# Patient Record
Sex: MALE | Race: WHITE | URBAN - METROPOLITAN AREA
[De-identification: names, ages, dates, MRNs, and addresses within clinical notes are randomized per-mention and may not be internally consistent; named-entity substitution may affect disease eponyms.]

---

## 2019-07-08 ENCOUNTER — OFFICE VISIT (OUTPATIENT)
Dept: URBAN - METROPOLITAN AREA CLINIC 23 | Facility: CLINIC | Age: 79
End: 2019-07-08
Payer: COMMERCIAL

## 2019-07-08 PROCEDURE — 92134 CPTRZ OPH DX IMG PST SGM RTA: CPT | Performed by: OPHTHALMOLOGY

## 2019-07-08 PROCEDURE — 99213 OFFICE O/P EST LOW 20 MIN: CPT | Performed by: OPHTHALMOLOGY

## 2019-07-08 ASSESSMENT — INTRAOCULAR PRESSURE
OD: 14
OS: 14

## 2019-07-08 NOTE — IMPRESSION/PLAN
Impression: Puckering of macula, left eye: H35.372. OS. Condition: stable. Vision: vision not affected. s/p PPVX for removal of ERM OD 12/08/2004 with Dr. Burke Benavidez post 
Pneumatic Retinopexy OD 09/07/2004 in [place] Plan: Discussed diagnosis in detail with patient. Exam and OCT OS shows minimal ILM / ERM with good vision. No treatment is required at this time. Will continue to observe condition and or symptoms. Exam and OCT OD shows the macula is stable, no ERM seen. Recommend a yearly follow - up, sooner if there is a change or decrease in vision.

## 2020-07-10 ENCOUNTER — OFFICE VISIT (OUTPATIENT)
Dept: URBAN - METROPOLITAN AREA CLINIC 23 | Facility: CLINIC | Age: 80
End: 2020-07-10
Payer: COMMERCIAL

## 2020-07-10 DIAGNOSIS — H35.372 PUCKERING OF MACULA, LEFT EYE: Primary | ICD-10-CM

## 2020-07-10 PROCEDURE — 99213 OFFICE O/P EST LOW 20 MIN: CPT | Performed by: OPHTHALMOLOGY

## 2020-07-10 ASSESSMENT — INTRAOCULAR PRESSURE
OS: 15
OD: 15

## 2020-07-10 NOTE — IMPRESSION/PLAN
Impression: Puckering of macula, left eye: H35.372. OS. Condition: stable. Vision: vision not affected. s/p PPVX for removal of ERM OD 12/08/2004 with Dr. Amee Moore post 
Pneumatic Retinopexy OD 09/07/2004 in [place] Plan: Due to Coronavirus COVID-19 pandemic and National Emergency, deferred Slit Lamp examination. Findings are based on OCT and Optos. OCT shows stable no significant edema or ERM OS  and Optos confirms this. No treatment is require at this time. Recommend a retina follow - up in 1 year.

## 2021-07-15 ENCOUNTER — OFFICE VISIT (OUTPATIENT)
Dept: URBAN - METROPOLITAN AREA CLINIC 23 | Facility: CLINIC | Age: 81
End: 2021-07-15
Payer: COMMERCIAL

## 2021-07-15 PROCEDURE — 99213 OFFICE O/P EST LOW 20 MIN: CPT | Performed by: OPHTHALMOLOGY

## 2021-07-15 PROCEDURE — 92134 CPTRZ OPH DX IMG PST SGM RTA: CPT | Performed by: OPHTHALMOLOGY

## 2021-07-15 ASSESSMENT — INTRAOCULAR PRESSURE
OD: 14
OS: 14

## 2021-07-15 NOTE — IMPRESSION/PLAN
Impression: Puckering of macula, left eye: H35.372. OS. Condition: stable. Vision: vision not affected. s/p PPVX for removal of ERM OD 12/08/2004 with Dr. Amee Moore post 
Pneumatic Retinopexy OD 09/07/2004 in [address]: Advised patient of condition. Discussed diagnosis in detail with patient. Exam OS shows stable trace ILM/ERM temporal to fovea. OCT OS shows the macula is stable, no significant edema or ERM and Optos OS confirms these findings. No treatment is required at this time. Recommend a retina follow-up in 1 year, sooner if there is a sudden change in vision.

## 2022-07-20 ENCOUNTER — OFFICE VISIT (OUTPATIENT)
Dept: URBAN - METROPOLITAN AREA CLINIC 23 | Facility: CLINIC | Age: 82
End: 2022-07-20
Payer: MEDICARE

## 2022-07-20 DIAGNOSIS — H34.211 HOLLENHORST'S PLAQUE OF RIGHT EYE: ICD-10-CM

## 2022-07-20 DIAGNOSIS — H35.372 PUCKERING OF MACULA, LEFT EYE: Primary | ICD-10-CM

## 2022-07-20 PROCEDURE — 99213 OFFICE O/P EST LOW 20 MIN: CPT | Performed by: OPHTHALMOLOGY

## 2022-07-20 PROCEDURE — 92134 CPTRZ OPH DX IMG PST SGM RTA: CPT | Performed by: OPHTHALMOLOGY

## 2022-07-20 ASSESSMENT — INTRAOCULAR PRESSURE
OD: 14
OS: 14

## 2022-07-20 NOTE — IMPRESSION/PLAN
Impression: Puckering of macula, left eye: H35.372. OS. Condition: stable. Vision: vision not affected. s/p PPVX for removal of ERM OD 12/08/2004 with Dr. Burke Benavidez post Pneumatic Retinopexy OD 09/07/2004 in [address]: Advised patient of condition. Discussed diagnosis in detail with patient. Exam OS shows stable trace ILM/ERM temporal to fovea. OCT OS shows stable appearing and Optos OS shows stable. No treatment is required at this time. Recommend a retina follow-up in 1 year, sooner if there is a sudden change in vision.

## 2022-07-20 NOTE — IMPRESSION/PLAN
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 Plan: Discussed diagnosis in detail with patient. Exam shows questionable Hollenhorst Plaque. Discussed treatment options with patient. Recommend a follow-up with PCP for further evaluation/testing: Echocardiogram and Carotid doppler. OCT shows stable and Optos shows retina appears fully attached, ? Hollenhorst Plaque.

## 2023-07-13 ENCOUNTER — OFFICE VISIT (OUTPATIENT)
Dept: URBAN - METROPOLITAN AREA CLINIC 23 | Facility: CLINIC | Age: 83
End: 2023-07-13
Payer: MEDICARE

## 2023-07-13 DIAGNOSIS — H34.211 HOLLENHORST'S PLAQUE OF RIGHT EYE: ICD-10-CM

## 2023-07-13 DIAGNOSIS — H35.372 PUCKERING OF MACULA, LEFT EYE: Primary | ICD-10-CM

## 2023-07-13 PROCEDURE — 92134 CPTRZ OPH DX IMG PST SGM RTA: CPT | Performed by: OPHTHALMOLOGY

## 2023-07-13 PROCEDURE — 99213 OFFICE O/P EST LOW 20 MIN: CPT | Performed by: OPHTHALMOLOGY

## 2023-07-13 ASSESSMENT — INTRAOCULAR PRESSURE
OD: 13
OS: 12

## 2023-07-13 NOTE — IMPRESSION/PLAN
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 Plan: Discussed diagnosis in detail with patient. Patient saw his cardiologist for   Echocardiogram and Carotid doppler. Results came back WNL with little to no build up. OCT shows stable and Optos shows retina appears fully attached.

## 2023-07-13 NOTE — IMPRESSION/PLAN
Impression: Puckering of macula, left eye: H35.372. OS. Condition: stable. Vision: vision not affected. s/p PPVX for removal of ERM OD 12/08/2004 with Dr. Richa Perry post Pneumatic Retinopexy OD 09/07/2004 in [place] Left. Plan: Advised patient of condition. Discussed diagnosis in detail with patient. Exam OS shows stable trace ILM/ERM temporal to fovea. OCT OS shows stable appearing and Optos OS shows stable. No treatment is required at this time. Recommend a retina follow-up in 1 year, sooner if there is a sudden change in vision.

## 2024-07-22 ENCOUNTER — OFFICE VISIT (OUTPATIENT)
Dept: URBAN - METROPOLITAN AREA CLINIC 28 | Facility: CLINIC | Age: 84
End: 2024-07-22
Payer: MEDICARE

## 2024-07-22 DIAGNOSIS — H35.372 PUCKERING OF MACULA, LEFT EYE: Primary | ICD-10-CM

## 2024-07-22 PROCEDURE — 99213 OFFICE O/P EST LOW 20 MIN: CPT | Performed by: OPHTHALMOLOGY

## 2024-07-22 PROCEDURE — 92134 CPTRZ OPH DX IMG PST SGM RTA: CPT | Performed by: OPHTHALMOLOGY

## 2024-07-22 ASSESSMENT — INTRAOCULAR PRESSURE
OS: 13
OD: 15

## 2025-07-21 ENCOUNTER — OFFICE VISIT (OUTPATIENT)
Dept: URBAN - METROPOLITAN AREA CLINIC 28 | Facility: CLINIC | Age: 85
End: 2025-07-21
Payer: MEDICARE

## 2025-07-21 DIAGNOSIS — H35.372 PUCKERING OF MACULA, LEFT EYE: Primary | ICD-10-CM

## 2025-07-21 PROCEDURE — 92134 CPTRZ OPH DX IMG PST SGM RTA: CPT | Performed by: OPHTHALMOLOGY

## 2025-07-21 PROCEDURE — 99213 OFFICE O/P EST LOW 20 MIN: CPT | Performed by: OPHTHALMOLOGY

## 2025-07-21 ASSESSMENT — INTRAOCULAR PRESSURE
OS: 16
OD: 16